# Patient Record
Sex: MALE | Race: WHITE | NOT HISPANIC OR LATINO | Employment: OTHER | URBAN - METROPOLITAN AREA
[De-identification: names, ages, dates, MRNs, and addresses within clinical notes are randomized per-mention and may not be internally consistent; named-entity substitution may affect disease eponyms.]

---

## 2019-01-22 ENCOUNTER — OFFICE VISIT (OUTPATIENT)
Dept: PODIATRY | Facility: CLINIC | Age: 34
End: 2019-01-22
Payer: MEDICARE

## 2019-01-22 VITALS
HEART RATE: 65 BPM | TEMPERATURE: 98 F | DIASTOLIC BLOOD PRESSURE: 89 MMHG | BODY MASS INDEX: 27.83 KG/M2 | HEIGHT: 73 IN | WEIGHT: 210 LBS | SYSTOLIC BLOOD PRESSURE: 135 MMHG

## 2019-01-22 DIAGNOSIS — M62.81 MUSCLE WEAKNESS OF LOWER EXTREMITY: ICD-10-CM

## 2019-01-22 DIAGNOSIS — G60.9 IDIOPATHIC PERIPHERAL NEUROPATHY: ICD-10-CM

## 2019-01-22 DIAGNOSIS — L84 CORN OR CALLUS: Primary | ICD-10-CM

## 2019-01-22 PROCEDURE — 11056 PARNG/CUTG B9 HYPRKR LES 2-4: CPT | Mod: Q8,,, | Performed by: PODIATRIST

## 2019-01-22 PROCEDURE — 99499 NO LOS: ICD-10-PCS | Mod: ,,, | Performed by: PODIATRIST

## 2019-01-22 PROCEDURE — 11720 PR DEBRIDEMENT OF NAIL(S), 1-5: ICD-10-PCS | Mod: 59,Q8,, | Performed by: PODIATRIST

## 2019-01-22 PROCEDURE — 11720 DEBRIDE NAIL 1-5: CPT | Mod: 59,Q8,, | Performed by: PODIATRIST

## 2019-01-22 PROCEDURE — 99499 UNLISTED E&M SERVICE: CPT | Mod: ,,, | Performed by: PODIATRIST

## 2019-01-22 PROCEDURE — 11056 PR TRIM BENIGN HYPERKERATOTIC SKIN LESION,2-4: ICD-10-PCS | Mod: Q8,,, | Performed by: PODIATRIST

## 2019-01-22 RX ORDER — POLYETHYLENE GLYCOL 3350 17 G/17G
POWDER, FOR SOLUTION ORAL
Refills: 5 | COMMUNITY
Start: 2018-11-06 | End: 2022-02-16 | Stop reason: SDUPTHER

## 2019-01-22 RX ORDER — CLONAZEPAM 1 MG/1
TABLET ORAL
Refills: 0 | COMMUNITY
Start: 2019-01-13 | End: 2021-05-25 | Stop reason: SDUPTHER

## 2019-01-22 NOTE — PROGRESS NOTES
[address] [facility] [address]  [place] [address]  Phone: [phone]   [phone]    Patient's PCP:Earnestine Frederick MD last seen 1/20/19  Referring Provider: No ref. provider found    Subjective:      Chief Complaint:: Callouses (Bilateral Heels and first toes)    HPI  Ike Guardado is a 33 y.o. male who presents with a complaint of callouses bilateral heels and first toes.Current symptoms include pain,cracking.Symptoms have stayed the same.Treatment to date have included shaving,skin softening creams. Patients rates pain 3/10 on pain scale.    Vitals:    01/22/19 0911   BP: 135/89   Pulse: 65   Temp: 97.8 °F (36.6 °C)     Shoe Size: 11    History reviewed. No pertinent surgical history.  Past Medical History:   Diagnosis Date    Chronic constipation      History reviewed. No pertinent family history.     Social History:   Marital Status: Single  Alcohol History:  has no alcohol history on file.  Tobacco History:  reports that he has quit smoking. he has never used smokeless tobacco.  Drug History:  has no drug history on file.    Review of patient's allergies indicates:  No Known Allergies    Current Outpatient Medications   Medication Sig Dispense Refill    clonazePAM (KLONOPIN) 1 MG tablet   0    methadone (DOLOPHINE) 10 MG tablet Take 40 mg by mouth 3 (three) times daily. Takes 4 pills in morning, two at lunch and two at night      polyethylene glycol (GLYCOLAX) 17 gram/dose powder MIX 1 CAPFUL IN 8 OUNCES OF WATER DAILY  5    DILTIAZEM HCL (DILTIAZEM 2% CREAM) Apply topically 3 (three) times daily. Apply topically to anal area. 30 g 2    temazepam (RESTORIL) 22.5 MG capsule Take 22.5 mg by mouth as needed.       No current facility-administered medications for this visit.        Review of Systems   Constitutional: Negative for chills, fatigue, fever and unexpected weight change.   HENT: Negative for hearing loss and trouble swallowing.    Eyes: Negative for photophobia and visual  "disturbance.   Respiratory: Negative for cough, shortness of breath and wheezing.    Cardiovascular: Negative for chest pain, palpitations and leg swelling.   Gastrointestinal: Negative for abdominal pain and nausea.   Genitourinary: Negative for dysuria and frequency.   Musculoskeletal: Negative for arthralgias, back pain and joint swelling.   Skin: Negative for rash.   Neurological: Negative for tremors, seizures, weakness, numbness and headaches.   Hematological: Does not bruise/bleed easily.         Objective:        Physical Exam:   General    Vitals reviewed.  Constitutional: He appears well-developed and well-nourished.             Physical Exam   Constitutional: He appears well-developed and well-nourished.   Musculoskeletal:        Feet:    Vitals reviewed.      Imaging:          Assessment:       1. Corn or callus - Left Foot    2. Muscle weakness of lower extremity    3. Idiopathic peripheral neuropathy      Plan:   Corn or callus - Left Foot  -     Routine Foot Care    Muscle weakness of lower extremity  -     Routine Foot Care    Idiopathic peripheral neuropathy  -     Routine Foot Care      Follow-up in about 3 months (around 4/22/2019).    Routine Foot Care  Date/Time: 1/22/2019 1:31 PM  Performed by: Deangelo Garcia DPM  Authorized by: Deangelo Garcia DPM     Time out: Immediately prior to procedure a "time out" was called to verify the correct patient, procedure, equipment, support staff and site/side marked as required.    Consent Done?:  Not Needed  Hyperkeratotic Skin Lesions?: Yes    Number of trimmed lesions:  3  Location(s):  Left 1st Toe, Right Heel and Left Heel    Nail Care Type:  Debride(Left 1st Toe and Right 1st Toe)  Patient tolerance:  Patient tolerated the procedure well with no immediate complications       - None    Counseling:     I provided patient education verbally regarding:   Patient diagnosis, treatment options, as well as alternatives, risks, and benefits.     This " note was created using Dragon voice recognition software that occasionally misinterpreted phrases or words.

## 2019-04-23 ENCOUNTER — OFFICE VISIT (OUTPATIENT)
Dept: PODIATRY | Facility: CLINIC | Age: 34
End: 2019-04-23
Payer: MEDICARE

## 2019-04-23 VITALS
BODY MASS INDEX: 27.83 KG/M2 | RESPIRATION RATE: 17 BRPM | OXYGEN SATURATION: 99 % | HEART RATE: 61 BPM | WEIGHT: 210 LBS | HEIGHT: 73 IN

## 2019-04-23 DIAGNOSIS — L98.9 BENIGN SKIN LESION: Primary | ICD-10-CM

## 2019-04-23 DIAGNOSIS — G60.9 IDIOPATHIC PERIPHERAL NEUROPATHY: ICD-10-CM

## 2019-04-23 DIAGNOSIS — M62.81 MUSCLE WEAKNESS OF LOWER EXTREMITY: ICD-10-CM

## 2019-04-23 PROCEDURE — 11056 PARNG/CUTG B9 HYPRKR LES 2-4: CPT | Mod: Q8,,, | Performed by: PODIATRIST

## 2019-04-23 PROCEDURE — 99499 UNLISTED E&M SERVICE: CPT | Mod: ,,, | Performed by: PODIATRIST

## 2019-04-23 PROCEDURE — 99499 NO LOS: ICD-10-PCS | Mod: ,,, | Performed by: PODIATRIST

## 2019-04-23 PROCEDURE — 11056 PR TRIM BENIGN HYPERKERATOTIC SKIN LESION,2-4: ICD-10-PCS | Mod: Q8,,, | Performed by: PODIATRIST

## 2019-04-23 NOTE — PROGRESS NOTES
[address] [facility] [address]  [place] [address]  Phone: [phone]   [phone]    Patient's PCP:Earnestine Frederick MD  Date Last Seen 01/20/2019  Referring Provider: No ref. provider found    Subjective:      Chief Complaint:: Routine Foot Care    HPI  Ike Guardado is a 34 y.o. male who presents for routine foot care of bilateral callouses. Treatment to date have included periodic debridement. Patients rates pain 1/10 on pain scale.        Vitals:    04/23/19 0842   Pulse: 61   Resp: 17     Shoe Size: 11    History reviewed. No pertinent surgical history.  Past Medical History:   Diagnosis Date    Chronic constipation      History reviewed. No pertinent family history.     Social History:   Marital Status: Single  Alcohol History:  has no alcohol history on file.  Tobacco History:  reports that he has quit smoking. He has never used smokeless tobacco.  Drug History:  has no drug history on file.    Review of patient's allergies indicates:  No Known Allergies    Current Outpatient Medications   Medication Sig Dispense Refill    clonazePAM (KLONOPIN) 1 MG tablet   0    DILTIAZEM HCL (DILTIAZEM 2% CREAM) Apply topically 3 (three) times daily. Apply topically to anal area. 30 g 2    methadone (DOLOPHINE) 10 MG tablet Take 40 mg by mouth 3 (three) times daily. Takes 4 pills in morning, two at lunch and two at night      polyethylene glycol (GLYCOLAX) 17 gram/dose powder MIX 1 CAPFUL IN 8 OUNCES OF WATER DAILY  5    temazepam (RESTORIL) 22.5 MG capsule Take 22.5 mg by mouth as needed.       No current facility-administered medications for this visit.        Review of Systems   Constitutional: Negative for chills, fatigue, fever and unexpected weight change.   HENT: Negative for hearing loss and trouble swallowing.    Eyes: Negative for photophobia and visual disturbance.   Respiratory: Negative for cough, shortness of breath and wheezing.    Cardiovascular: Negative for chest pain, palpitations  and leg swelling.   Gastrointestinal: Negative for abdominal pain and nausea.   Genitourinary: Negative for dysuria and frequency.   Musculoskeletal: Negative for arthralgias, back pain and joint swelling.   Skin: Negative for rash.   Neurological: Negative for tremors, seizures, weakness, numbness and headaches.   Hematological: Does not bruise/bleed easily.         Objective:        Physical Exam:   Foot Exam    General  General Appearance: appears stated age and healthy   Orientation: alert and oriented to person, place, and time   Affect: appropriate   Gait: antalgic       Right Foot/Ankle     Neurovascular  Dorsalis pedis: 2+  Posterior tibial: 2+  Saphenous nerve sensation: normal  Tibial nerve sensation: normal  Superficial peroneal nerve sensation: normal  Deep peroneal nerve sensation: normal  Sural nerve sensation: normal    Range of Motion    Passive  Ankle dorsiflexion: 0  Ankle plantar flexion: 10    Active  Ankle dorsiflexion: 0  Ankle plantar flexion: 10      Left Foot/Ankle      Neurovascular  Dorsalis pedis: 2+  Posterior tibial: 2+  Saphenous nerve sensation: normal  Tibial nerve sensation: normal  Superficial peroneal nerve sensation: normal  Deep peroneal nerve sensation: normal  Sural nerve sensation: normal    Range of Motion    Passive  Ankle dorsiflexion: 0  Plantar flexion: 10    Active  Ankle dorsiflexion: 0  Plantar flexion: 10          Physical Exam   Cardiovascular:   Pulses:       Dorsalis pedis pulses are 2+ on the right side, and 2+ on the left side.        Posterior tibial pulses are 2+ on the right side, and 2+ on the left side.   Musculoskeletal:        Feet:        Imaging:            Assessment:       1. Benign skin lesion    2. Idiopathic peripheral neuropathy    3. Muscle weakness of lower extremity      Plan:   Benign skin lesion    Idiopathic peripheral neuropathy    Muscle weakness of lower extremity    The patient will continue using skin softeners to the feet to help  reduce the thickness of the keratosis. The keratosis on both heels and the plantar surface of the forefoot were debrided to normal skin without any hemorrhage. The patient tolerated procedures well without any complications. The patient will return as needed.  No follow-ups on file.    Procedures - None    Counseling:     I provided patient education verbally regarding:   Patient diagnosis, treatment options, as well as alternatives, risks, and benefits.     This note was created using Dragon voice recognition software that occasionally misinterpreted phrases or words.

## 2019-07-23 ENCOUNTER — OFFICE VISIT (OUTPATIENT)
Dept: PODIATRY | Facility: CLINIC | Age: 34
End: 2019-07-23
Payer: MEDICARE

## 2019-07-23 VITALS
HEART RATE: 60 BPM | WEIGHT: 220 LBS | DIASTOLIC BLOOD PRESSURE: 83 MMHG | SYSTOLIC BLOOD PRESSURE: 124 MMHG | BODY MASS INDEX: 29.16 KG/M2 | HEIGHT: 73 IN

## 2019-07-23 DIAGNOSIS — G60.9 IDIOPATHIC PERIPHERAL NEUROPATHY: Primary | ICD-10-CM

## 2019-07-23 DIAGNOSIS — M62.81 MUSCLE WEAKNESS OF LOWER EXTREMITY: ICD-10-CM

## 2019-07-23 DIAGNOSIS — L84 CORN OR CALLUS: ICD-10-CM

## 2019-07-23 PROCEDURE — 11056 ROUTINE FOOT CARE: ICD-10-PCS | Mod: Q8,ICN,, | Performed by: PODIATRIST

## 2019-07-23 PROCEDURE — 11056 PARNG/CUTG B9 HYPRKR LES 2-4: CPT | Mod: Q8,ICN,, | Performed by: PODIATRIST

## 2019-07-23 NOTE — PROGRESS NOTES
[address] [facility] [address]  [place] [address]  Phone: [phone]   [phone]    Patient's PCP:Earnestine Frederick MD-Date Last Seen 05/08/2019  Referring Provider: No ref. provider found    Subjective:      Chief Complaint:: Callouses (Bilateral)    HPI  Ike Guardado is a 34 y.o. male who presents today for debridement of callouses. Patients rates pain 0/10 on pain scale.        Vitals:    07/23/19 0849   BP: 124/83   Pulse: 60     Shoe Size:     History reviewed. No pertinent surgical history.  Past Medical History:   Diagnosis Date    Chronic constipation      History reviewed. No pertinent family history.     Social History:   Marital Status: Single  Alcohol History:  has no alcohol history on file.  Tobacco History:  reports that he has quit smoking. He has never used smokeless tobacco.  Drug History:  has no drug history on file.    Review of patient's allergies indicates:  No Known Allergies    Current Outpatient Medications   Medication Sig Dispense Refill    clonazePAM (KLONOPIN) 1 MG tablet   0    DILTIAZEM HCL (DILTIAZEM 2% CREAM) Apply topically 3 (three) times daily. Apply topically to anal area. 30 g 2    methadone (DOLOPHINE) 10 MG tablet Take 40 mg by mouth 3 (three) times daily. Takes 4 pills in morning, two at lunch and two at night      polyethylene glycol (GLYCOLAX) 17 gram/dose powder MIX 1 CAPFUL IN 8 OUNCES OF WATER DAILY  5    temazepam (RESTORIL) 22.5 MG capsule Take 22.5 mg by mouth as needed.       No current facility-administered medications for this visit.        Review of Systems      Objective:        Physical Exam:   Foot Exam    Right Foot/Ankle     Neurovascular  Dorsalis pedis: 2+  Posterior tibial: 2+  Saphenous nerve sensation: diminished  Tibial nerve sensation: diminished  Superficial peroneal nerve sensation: diminished  Deep peroneal nerve sensation: diminished  Sural nerve sensation: diminished    Muscle Strength  Ankle dorsiflexion: 4-  Ankle  "plantar flexion: 4-  Ankle inversion: 4-  Ankle eversion: 4-  Great toe extension: 4-  Great toe flexion: 4-    Range of Motion    Passive  Ankle dorsiflexion: 0    Active  Ankle dorsiflexion: 0      Left Foot/Ankle      Neurovascular  Dorsalis pedis: 2+  Posterior tibial: 2+  Saphenous nerve sensation: diminished  Tibial nerve sensation: diminished  Superficial peroneal nerve sensation: diminished  Deep peroneal nerve sensation: diminished  Sural nerve sensation: diminished    Muscle Strength  Ankle dorsiflexion: 4-  Ankle plantar flexion: 4-  Ankle inversion: 4-  Ankle eversion: 4-  Great toe extension: 4-  Great toe flexion: 4-    Range of Motion    Passive  Ankle dorsiflexion: 0    Active  Ankle dorsiflexion: 0          Physical Exam   Cardiovascular:   Pulses:       Dorsalis pedis pulses are 2+ on the right side, and 2+ on the left side.        Posterior tibial pulses are 2+ on the right side, and 2+ on the left side.   Musculoskeletal:        Legs:       Feet:        Imaging:            Assessment:       1. Idiopathic peripheral neuropathy    2. Muscle weakness of lower extremity    3. Corn or callus - Left Foot      Plan:   Idiopathic peripheral neuropathy    Muscle weakness of lower extremity    Corn or callus - Left Foot  -     Routine Foot Care      No follow-ups on file.    Routine Foot Care  Date/Time: 7/23/2019 9:01 AM  Performed by: Deangelo Garcia DPM  Authorized by: Deangelo Garcia DPM     Time out: Immediately prior to procedure a "time out" was called to verify the correct patient, procedure, equipment, support staff and site/side marked as required.    Consent Done?:  Not Needed  Hyperkeratotic Skin Lesions?: Yes    Number of trimmed lesions:  2  Location(s):  Left 1st Toe and Right Heel    Nail Care Type:  Trim (No nails debrided)  Patient tolerance:  Patient tolerated the procedure well with no immediate complications            - None    Counseling:     I provided patient education " verbally regarding:   Patient diagnosis, treatment options, as well as alternatives, risks, and benefits.     This note was created using Dragon voice recognition software that occasionally misinterpreted phrases or words.

## 2019-10-29 ENCOUNTER — OFFICE VISIT (OUTPATIENT)
Dept: PODIATRY | Facility: CLINIC | Age: 34
End: 2019-10-29
Payer: MEDICARE

## 2019-10-29 VITALS
HEART RATE: 56 BPM | HEIGHT: 73 IN | WEIGHT: 220 LBS | SYSTOLIC BLOOD PRESSURE: 121 MMHG | BODY MASS INDEX: 29.16 KG/M2 | DIASTOLIC BLOOD PRESSURE: 79 MMHG

## 2019-10-29 DIAGNOSIS — M62.81 MUSCLE WEAKNESS OF LOWER EXTREMITY: ICD-10-CM

## 2019-10-29 DIAGNOSIS — G60.9 IDIOPATHIC PERIPHERAL NEUROPATHY: Primary | ICD-10-CM

## 2019-10-29 DIAGNOSIS — L84 CORN OR CALLUS: ICD-10-CM

## 2019-10-29 PROCEDURE — 99213 OFFICE O/P EST LOW 20 MIN: CPT | Mod: 25 | Performed by: PODIATRIST

## 2019-10-29 PROCEDURE — 11056 PARNG/CUTG B9 HYPRKR LES 2-4: CPT | Mod: S$PBB,Q8,, | Performed by: PODIATRIST

## 2019-10-29 PROCEDURE — 11056 PARNG/CUTG B9 HYPRKR LES 2-4: CPT | Mod: PBBFAC | Performed by: PODIATRIST

## 2019-10-29 PROCEDURE — 11056 PR TRIM BENIGN HYPERKERATOTIC SKIN LESION,2-4: ICD-10-PCS | Mod: S$PBB,Q8,, | Performed by: PODIATRIST

## 2019-10-29 NOTE — PROGRESS NOTES
[address] [facility] [address]  [place] [address]  Phone: [phone]   [phone]    Patient's PCP:Earnestine Frederick MD  Referring Provider: No ref. provider found    Subjective:       PCP: Dr. Earnestine Frederick  Date Last Seen:  08/08/2019    Chief Complaint:: Callouses (left foot)    KENNA Guardado is a 34 y.o. male who presents for debridement of callouses. Patients rates pain 0/10 on pain scale.      Vitals:    10/29/19 0846   BP: 121/79   Pulse: (!) 56     Shoe Size: 11    History reviewed. No pertinent surgical history.  Past Medical History:   Diagnosis Date    Chronic constipation      History reviewed. No pertinent family history.     Social History:   Marital Status: Single  Alcohol History:  has no alcohol history on file.  Tobacco History:  reports that he has quit smoking. He has never used smokeless tobacco.  Drug History:  has no drug history on file.    Review of patient's allergies indicates:  No Known Allergies    Current Outpatient Medications   Medication Sig Dispense Refill    clonazePAM (KLONOPIN) 1 MG tablet   0    DILTIAZEM HCL (DILTIAZEM 2% CREAM) Apply topically 3 (three) times daily. Apply topically to anal area. 30 g 2    methadone (DOLOPHINE) 10 MG tablet Take 40 mg by mouth 3 (three) times daily. Takes 4 pills in morning, two at lunch and two at night      polyethylene glycol (GLYCOLAX) 17 gram/dose powder MIX 1 CAPFUL IN 8 OUNCES OF WATER DAILY  5    temazepam (RESTORIL) 22.5 MG capsule Take 22.5 mg by mouth as needed.       No current facility-administered medications for this visit.        Review of Systems      Objective:        Physical Exam:   Foot Exam    General  General Appearance: appears stated age and healthy   Orientation: alert and oriented to person, place, and time   Affect: appropriate   Gait: antalgic   Assistance: cane use       Right Foot/Ankle     Inspection and Palpation  Ecchymosis: none  Tenderness: none   Swelling: none   Arch:  normal  Hammertoes: absent  Claw Toes: absent  Hallux valgus: no  Hallux limitus: no  Skin Exam: callus (Thick keratosis plantar right heel and plantar 1st toe with no ulceration or signs of infection);     Neurovascular  Dorsalis pedis: 2+  Posterior tibial: 2+  Saphenous nerve sensation: diminished  Tibial nerve sensation: diminished  Superficial peroneal nerve sensation: diminished  Deep peroneal nerve sensation: diminished  Sural nerve sensation: diminished    Muscle Strength  Ankle dorsiflexion: 4-  Ankle plantar flexion: 4-  Ankle inversion: 4-  Ankle eversion: 4-  Great toe extension: 4-  Great toe flexion: 4-    Range of Motion    Passive  Ankle dorsiflexion: 0  Ankle plantar flexion: 10    Active  Ankle dorsiflexion: 0  Ankle plantar flexion: 10      Left Foot/Ankle      Inspection and Palpation  Ecchymosis: none  Tenderness: none   Swelling: none   Arch: normal  Hammertoes: absent  Claw toes: absent  Hallux valgus: no  Hallux limitus: no  Skin Exam: callus (Thick keratosis plantar 1st toe no ulceration or signs of infection);     Neurovascular  Dorsalis pedis: 2+  Posterior tibial: 2+  Saphenous nerve sensation: diminished  Tibial nerve sensation: diminished  Superficial peroneal nerve sensation: diminished  Deep peroneal nerve sensation: diminished  Sural nerve sensation: diminished    Muscle Strength  Ankle dorsiflexion: 4-  Ankle plantar flexion: 4-  Ankle inversion: 4-  Ankle eversion: 4-  Great toe extension: 4-  Great toe flexion: 4-    Range of Motion    Passive  Ankle dorsiflexion: 0  Plantar flexion: 10    Active  Ankle dorsiflexion: 0  Plantar flexion: 10          Physical Exam   Cardiovascular:   Pulses:       Dorsalis pedis pulses are 2+ on the right side, and 2+ on the left side.        Posterior tibial pulses are 2+ on the right side, and 2+ on the left side.   Feet:   Right Foot:   Skin Integrity: Positive for callus (Thick keratosis plantar right heel and plantar 1st toe with no ulceration  or signs of infection).   Left Foot:   Skin Integrity: Positive for callus (Thick keratosis plantar 1st toe no ulceration or signs of infection).       Imaging:            Assessment:       1. Idiopathic peripheral neuropathy    2. Muscle weakness of lower extremity    3. Corn or callus - Left Foot      Plan:   Idiopathic peripheral neuropathy    Muscle weakness of lower extremity    Corn or callus - Left Foot    With patient's verbal consent the hyperkeratotic lesions on both feet were debrided to healthy appearing skin using a sterile #15 scalpel. No anesthesia required. No blood loss. She tolerated the procedure well without complications.  Return as needed  Follow up in about 3 months (around 1/29/2020) for c&c q8.    Procedures - None    Counseling:     I provided patient education verbally regarding:   Patient diagnosis, treatment options, as well as alternatives, risks, and benefits.     This note was created using Dragon voice recognition software that occasionally misinterpreted phrases or words.

## 2020-02-04 ENCOUNTER — OFFICE VISIT (OUTPATIENT)
Dept: PODIATRY | Facility: CLINIC | Age: 35
End: 2020-02-04
Payer: MEDICARE

## 2020-02-04 VITALS
DIASTOLIC BLOOD PRESSURE: 81 MMHG | WEIGHT: 220 LBS | HEART RATE: 80 BPM | BODY MASS INDEX: 29.16 KG/M2 | HEIGHT: 73 IN | SYSTOLIC BLOOD PRESSURE: 115 MMHG

## 2020-02-04 DIAGNOSIS — L84 CORN OR CALLUS: ICD-10-CM

## 2020-02-04 DIAGNOSIS — M62.81 MUSCLE WEAKNESS OF LOWER EXTREMITY: ICD-10-CM

## 2020-02-04 DIAGNOSIS — G60.9 IDIOPATHIC PERIPHERAL NEUROPATHY: Primary | ICD-10-CM

## 2020-02-04 PROCEDURE — 99213 OFFICE O/P EST LOW 20 MIN: CPT | Performed by: PODIATRIST

## 2020-02-04 PROCEDURE — 11056 PARNG/CUTG B9 HYPRKR LES 2-4: CPT | Mod: Q8,S$PBB,, | Performed by: PODIATRIST

## 2020-02-04 PROCEDURE — 11056 PR TRIM BENIGN HYPERKERATOTIC SKIN LESION,2-4: ICD-10-PCS | Mod: Q8,S$PBB,, | Performed by: PODIATRIST

## 2020-02-04 PROCEDURE — 11056 PARNG/CUTG B9 HYPRKR LES 2-4: CPT | Mod: Q8,PBBFAC | Performed by: PODIATRIST

## 2020-02-04 NOTE — PROGRESS NOTES
[address] [facility] [address]  [place] [address]  Phone: [phone]   [phone]    Patient's PCP:Earnestine Frederick MD   Referring Provider: No ref. provider found    Subjective:       PCP:  Earnestine Frederick  Date Last Seen:  11/05/19    Chief Complaint:: Callouses (bilateral callouses)    KENNA Guardado is a 34 y.o. male who returns for complaint of bilateral calluses for debridement of callouses. Patients rates pain 0/10 on pain scale.    Vitals:    02/04/20 0840   BP: 115/81   Pulse: 80     Shoe Size:     History reviewed. No pertinent surgical history.  Past Medical History:   Diagnosis Date    Chronic constipation      History reviewed. No pertinent family history.     Social History:   Marital Status: Single  Alcohol History:  has no alcohol history on file.  Tobacco History:  reports that he has quit smoking. He has never used smokeless tobacco.  Drug History:  has no drug history on file.    Review of patient's allergies indicates:  No Known Allergies    Current Outpatient Medications   Medication Sig Dispense Refill    clonazePAM (KLONOPIN) 1 MG tablet   0    DILTIAZEM HCL (DILTIAZEM 2% CREAM) Apply topically 3 (three) times daily. Apply topically to anal area. 30 g 2    methadone (DOLOPHINE) 10 MG tablet Take 40 mg by mouth 3 (three) times daily. Takes 4 pills in morning, two at lunch and two at night      polyethylene glycol (GLYCOLAX) 17 gram/dose powder MIX 1 CAPFUL IN 8 OUNCES OF WATER DAILY  5    temazepam (RESTORIL) 22.5 MG capsule Take 22.5 mg by mouth as needed.       No current facility-administered medications for this visit.        Review of Systems      Objective:        Physical Exam:   Foot Exam    General  General Appearance: appears stated age and healthy   Orientation: alert and oriented to person, place, and time   Affect: appropriate   Gait: antalgic       Right Foot/Ankle     Inspection and Palpation  Tenderness: great toe interphalangeal joint   Hallux  limitus: yes  Skin Exam: callus (plantar 1 st toe without ulcerartion.  Heel calluses with fissures.);     Neurovascular  Dorsalis pedis: absent  Posterior tibial: 1+  Saphenous nerve sensation: diminished  Tibial nerve sensation: diminished  Superficial peroneal nerve sensation: diminished  Deep peroneal nerve sensation: diminished  Sural nerve sensation: diminished    Muscle Strength  Ankle dorsiflexion: 4-  Ankle plantar flexion: 4-  Ankle inversion: 4-  Ankle eversion: 4-  Great toe extension: 4-  Great toe flexion: 4-    Range of Motion    Passive  Ankle dorsiflexion: 0  1st MTP extension: 5        Left Foot/Ankle      Inspection and Palpation  Tenderness: great toe interphalangeal joint and calcaneus tenderness   Hallux limitus: yes  Skin Exam: callus (callus with dry subdermal hemmoraghe without infection.); (fungal incurvated 1st toenail without infection)    Neurovascular  Dorsalis pedis: absent  Posterior tibial: 1+  Saphenous nerve sensation: diminished  Tibial nerve sensation: diminished  Superficial peroneal nerve sensation: diminished  Deep peroneal nerve sensation: diminished  Sural nerve sensation: diminished    Muscle Strength  Ankle dorsiflexion: 4-  Ankle plantar flexion: 4-  Ankle inversion: 4-  Ankle eversion: 4-  Great toe extension: 4-  Great toe flexion: 4-    Range of Motion    Passive  Ankle dorsiflexion: 0  1st MTP extension: 5            Physical Exam   Cardiovascular:   Pulses:       Dorsalis pedis pulses are Absent on the right side, and Absent on the left side.        Posterior tibial pulses are 1+ on the right side, and 1+ on the left side.   Feet:   Right Foot:   Skin Integrity: Positive for callus (plantar 1 st toe without ulcerartion.  Heel calluses with fissures.).   Left Foot:   Skin Integrity: Positive for callus (callus with dry subdermal hemmoraghe without infection.).       This patient has documented high risk feet requiring routine maintenance and has the following  qualifying factors:  Absent dorsalis pedis pulse bilateral and Hair growth is absent, nail changes with thickening and Skin color is rubor      Imaging:            Assessment:       1. Idiopathic peripheral neuropathy    2. Muscle weakness of lower extremity    3. Corn or callus - Left Foot      Plan:   Idiopathic peripheral neuropathy    Muscle weakness of lower extremity    Corn or callus - Left Foot    With patient's verbal consent the hyperkeratotic lesions  Callus/grade 0 ulcer 1 st toe bilateral and left heel  were debrided to healthy appearing skin using a scalpel. Patient relates relief of pain following the procedure. Patient tolerated the procedure well without complications. No anesthesia or hemostasis required. No blood loss.  Follow up in about 3 months (around 5/4/2020).    Procedures - None    Counseling:     I provided patient education verbally regarding:   Patient diagnosis, treatment options, as well as alternatives, risks, and benefits.     This note was created using Dragon voice recognition software that occasionally misinterpreted phrases or words.

## 2020-05-07 ENCOUNTER — OFFICE VISIT (OUTPATIENT)
Dept: PODIATRY | Facility: CLINIC | Age: 35
End: 2020-05-07
Payer: MEDICARE

## 2020-05-07 VITALS — BODY MASS INDEX: 27.83 KG/M2 | HEIGHT: 73 IN | WEIGHT: 210 LBS | TEMPERATURE: 99 F

## 2020-05-07 DIAGNOSIS — M62.81 MUSCLE WEAKNESS OF LOWER EXTREMITY: Primary | ICD-10-CM

## 2020-05-07 DIAGNOSIS — L98.9 BENIGN SKIN LESION: ICD-10-CM

## 2020-05-07 DIAGNOSIS — G60.9 IDIOPATHIC PERIPHERAL NEUROPATHY: ICD-10-CM

## 2020-05-07 DIAGNOSIS — L84 CORN OR CALLUS: ICD-10-CM

## 2020-05-07 PROCEDURE — 99213 PR OFFICE/OUTPT VISIT, EST, LEVL III, 20-29 MIN: ICD-10-PCS | Mod: S$PBB,,, | Performed by: PODIATRIST

## 2020-05-07 PROCEDURE — 99213 OFFICE O/P EST LOW 20 MIN: CPT | Mod: S$PBB,,, | Performed by: PODIATRIST

## 2020-05-07 PROCEDURE — 99213 OFFICE O/P EST LOW 20 MIN: CPT | Performed by: PODIATRIST

## 2020-05-07 RX ORDER — ICOSAPENT ETHYL 1000 MG/1
CAPSULE ORAL
COMMUNITY
Start: 2020-05-05 | End: 2021-05-24

## 2020-05-07 NOTE — PROGRESS NOTES
[address] [facility] [address]  [place] [address]  Phone: [phone]   [phone]    Patient's PCP:Earnestine Frederick MD  Referring Provider: No ref. provider found    Subjective:      Chief Complaint:: Callouses    HPI  Ike Guardado is a 35 y.o. male who presents for debridement of bilateral calluses.  Patient rates pain 0/10 on a pain scale.      PCP: Dr. Earnestine Frederick  Date Last Seen: 05/01/2020 (telemedicine visit)     Vitals:    05/07/20 0850   Temp: 98.7 °F (37.1 °C)     Shoe Size:     History reviewed. No pertinent surgical history.  Past Medical History:   Diagnosis Date    Chronic constipation      History reviewed. No pertinent family history.     Social History:   Marital Status: Single  Alcohol History:  has no alcohol history on file.  Tobacco History:  reports that he has quit smoking. He has never used smokeless tobacco.  Drug History:  has no drug history on file.    Review of patient's allergies indicates:  No Known Allergies    Current Outpatient Medications   Medication Sig Dispense Refill    clonazePAM (KLONOPIN) 1 MG tablet   0    DILTIAZEM HCL (DILTIAZEM 2% CREAM) Apply topically 3 (three) times daily. Apply topically to anal area. 30 g 2    methadone (DOLOPHINE) 10 MG tablet Take 40 mg by mouth 3 (three) times daily. Takes 4 pills in morning, two at lunch and two at night      polyethylene glycol (GLYCOLAX) 17 gram/dose powder MIX 1 CAPFUL IN 8 OUNCES OF WATER DAILY  5    temazepam (RESTORIL) 22.5 MG capsule Take 22.5 mg by mouth as needed.      VASCEPA 1 gram Cap        No current facility-administered medications for this visit.        Review of Systems      Objective:        Physical Exam:   Foot Exam    General  General Appearance: appears stated age and healthy   Orientation: alert and oriented to person, place, and time   Affect: appropriate   Gait: antalgic   Assistance: cane use       Right Foot/Ankle     Inspection and Palpation  Tenderness: calcaneus  tenderness and great toe interphalangeal joint   Swelling: none   Arch: normal  Skin Exam: callus (Plantar 1st toe and plantar heel with no ulceration);     Neurovascular  Dorsalis pedis: 2+  Posterior tibial: 2+  Saphenous nerve sensation: diminished  Tibial nerve sensation: diminished  Superficial peroneal nerve sensation: diminished  Deep peroneal nerve sensation: diminished  Sural nerve sensation: diminished    Muscle Strength  Ankle dorsiflexion: 4  Ankle plantar flexion: 4  Ankle inversion: 4  Ankle eversion: 4    Range of Motion    Passive  Ankle dorsiflexion: 0  Ankle plantar flexion: 10    Active  Ankle dorsiflexion: 0  Ankle plantar flexion: 10      Left Foot/Ankle      Inspection and Palpation  Tenderness: great toe interphalangeal joint   Swelling: none   Arch: normal  Skin Exam: callus (Plantar 1st toe with no ulceration);     Neurovascular  Dorsalis pedis: 2+  Posterior tibial: 2+  Saphenous nerve sensation: diminished  Tibial nerve sensation: diminished  Superficial peroneal nerve sensation: diminished  Deep peroneal nerve sensation: diminished  Sural nerve sensation: diminished    Muscle Strength  Ankle dorsiflexion: 4  Ankle plantar flexion: 4  Ankle inversion: 4  Ankle eversion: 4    Range of Motion    Passive  Ankle dorsiflexion: 0  Plantar flexion: 10    Active  Ankle dorsiflexion: 0  Plantar flexion: 10          Physical Exam   Cardiovascular:   Pulses:       Dorsalis pedis pulses are 2+ on the right side, and 2+ on the left side.        Posterior tibial pulses are 2+ on the right side, and 2+ on the left side.   Musculoskeletal:        Legs:       Feet:    Feet:   Right Foot:   Skin Integrity: Positive for callus (Plantar 1st toe and plantar heel with no ulceration).   Left Foot:   Skin Integrity: Positive for callus (Plantar 1st toe with no ulceration).       Imaging:            Assessment:       1. Muscle weakness of lower extremity    2. Idiopathic peripheral neuropathy    3. Corn or callus  - Left Foot    4. Benign skin lesion      Plan:   Muscle weakness of lower extremity    Idiopathic peripheral neuropathy    Corn or callus - Left Foot    Benign skin lesion     Debridement of multiple keratosis return as needed.  No follow-ups on file.    Procedures - None    Counseling:     I provided patient education verbally regarding:   Patient diagnosis, treatment options, as well as alternatives, risks, and benefits.     This note was created using Dragon voice recognition software that occasionally misinterpreted phrases or words.

## 2020-08-13 ENCOUNTER — OFFICE VISIT (OUTPATIENT)
Dept: PODIATRY | Facility: CLINIC | Age: 35
End: 2020-08-13
Payer: MEDICARE

## 2020-08-13 VITALS
TEMPERATURE: 98 F | HEART RATE: 78 BPM | BODY MASS INDEX: 27.83 KG/M2 | DIASTOLIC BLOOD PRESSURE: 81 MMHG | HEIGHT: 73 IN | WEIGHT: 210 LBS | SYSTOLIC BLOOD PRESSURE: 115 MMHG

## 2020-08-13 DIAGNOSIS — L84 CORN OR CALLUS: ICD-10-CM

## 2020-08-13 DIAGNOSIS — L98.9 BENIGN SKIN LESION: ICD-10-CM

## 2020-08-13 DIAGNOSIS — M62.81 MUSCLE WEAKNESS OF LOWER EXTREMITY: Primary | ICD-10-CM

## 2020-08-13 DIAGNOSIS — G60.9 IDIOPATHIC PERIPHERAL NEUROPATHY: ICD-10-CM

## 2020-08-13 PROCEDURE — 99213 OFFICE O/P EST LOW 20 MIN: CPT | Mod: S$PBB,,, | Performed by: PODIATRIST

## 2020-08-13 PROCEDURE — 99213 PR OFFICE/OUTPT VISIT, EST, LEVL III, 20-29 MIN: ICD-10-PCS | Mod: S$PBB,,, | Performed by: PODIATRIST

## 2020-08-13 PROCEDURE — 99214 OFFICE O/P EST MOD 30 MIN: CPT | Performed by: PODIATRIST

## 2020-08-13 RX ORDER — FENOFIBRATE 160 MG/1
TABLET ORAL
COMMUNITY
Start: 2020-08-05 | End: 2021-05-24

## 2020-08-13 NOTE — PROGRESS NOTES
[address] [facility] [address]  [place] [address]  Phone: [phone]   [phone]    Patient's PCP:Earnestine Frederick MD  Referring Provider: No ref. provider found    Subjective:      Chief Complaint:: Routine Foot Care (callus-Q8)    HPI  Ike Guardado is a 35 y.o. male who presents with multiple callus/benign skin lesions . Pain scale 3 /10.       There were no vitals filed for this visit.  Shoe Size:     History reviewed. No pertinent surgical history.  Past Medical History:   Diagnosis Date    Chronic constipation      History reviewed. No pertinent family history.     Social History:   Marital Status: Single  Alcohol History:  has no history on file for alcohol.  Tobacco History:  reports that he has quit smoking. He has never used smokeless tobacco.  Drug History:  has no history on file for drug.    Review of patient's allergies indicates:  No Known Allergies    Current Outpatient Medications   Medication Sig Dispense Refill    clonazePAM (KLONOPIN) 1 MG tablet   0    DILTIAZEM HCL (DILTIAZEM 2% CREAM) Apply topically 3 (three) times daily. Apply topically to anal area. 30 g 2    fenofibrate 160 MG Tab       methadone (DOLOPHINE) 10 MG tablet Take 40 mg by mouth 3 (three) times daily. Takes 4 pills in morning, two at lunch and two at night      polyethylene glycol (GLYCOLAX) 17 gram/dose powder MIX 1 CAPFUL IN 8 OUNCES OF WATER DAILY  5    temazepam (RESTORIL) 22.5 MG capsule Take 22.5 mg by mouth as needed.      VASCEPA 1 gram Cap        No current facility-administered medications for this visit.        Review of Systems      Objective:        Physical Exam:   Foot Exam    General  General Appearance: appears stated age and healthy   Orientation: alert and oriented to person, place, and time   Affect: appropriate   Gait: unimpaired       Right Foot/Ankle     Inspection and Palpation  Skin Exam: callus (Plantar 1st toe, plantar heel with no ulceration or signs of infection);      Neurovascular  Dorsalis pedis: 2+  Posterior tibial: 2+  Saphenous nerve sensation: diminished  Tibial nerve sensation: diminished  Superficial peroneal nerve sensation: diminished  Deep peroneal nerve sensation: diminished  Sural nerve sensation: diminished      Left Foot/Ankle      Inspection and Palpation  Skin Exam: callus (Plantar 1st toe and 1st MPJ with no ulceration or signs of infection);     Neurovascular  Dorsalis pedis: 2+  Posterior tibial: 2+  Saphenous nerve sensation: diminished  Tibial nerve sensation: diminished  Superficial peroneal nerve sensation: diminished  Deep peroneal nerve sensation: diminished  Sural nerve sensation: diminished          Physical Exam   Cardiovascular:   Pulses:       Dorsalis pedis pulses are 2+ on the right side and 2+ on the left side.        Posterior tibial pulses are 2+ on the right side and 2+ on the left side.   Feet:   Right Foot:   Skin Integrity: Positive for callus (Plantar 1st toe, plantar heel with no ulceration or signs of infection).   Left Foot:   Skin Integrity: Positive for callus (Plantar 1st toe and 1st MPJ with no ulceration or signs of infection).       Imaging:            Assessment:       1. Muscle weakness of lower extremity    2. Idiopathic peripheral neuropathy    3. Corn or callus - Left Foot    4. Benign skin lesion      Plan:   Muscle weakness of lower extremity    Idiopathic peripheral neuropathy    Corn or callus - Left Foot    Benign skin lesion     1.  Today evaluated patient will continue skin softeners accommodation in the shoes to relieve the problems of pressure on by his foot.  He will use callus smooth the nurse to keep it under control.  He return as needed.  There is no signs of infection today so no further treatment is needed.  No follow-ups on file.    Procedures - None    Counseling:     I provided patient education verbally regarding:   Patient diagnosis, treatment options, as well as alternatives, risks, and benefits.      This note was created using Dragon voice recognition software that occasionally misinterpreted phrases or words.

## 2020-11-12 ENCOUNTER — OFFICE VISIT (OUTPATIENT)
Dept: PODIATRY | Facility: CLINIC | Age: 35
End: 2020-11-12
Payer: MEDICARE

## 2020-11-12 VITALS
HEIGHT: 73 IN | SYSTOLIC BLOOD PRESSURE: 115 MMHG | TEMPERATURE: 98 F | HEART RATE: 78 BPM | WEIGHT: 215 LBS | DIASTOLIC BLOOD PRESSURE: 80 MMHG | BODY MASS INDEX: 28.49 KG/M2 | RESPIRATION RATE: 16 BRPM

## 2020-11-12 DIAGNOSIS — L98.9 BENIGN SKIN LESION: ICD-10-CM

## 2020-11-12 DIAGNOSIS — M62.81 MUSCLE WEAKNESS OF LOWER EXTREMITY: Primary | ICD-10-CM

## 2020-11-12 DIAGNOSIS — G60.9 IDIOPATHIC PERIPHERAL NEUROPATHY: ICD-10-CM

## 2020-11-12 PROCEDURE — 99213 PR OFFICE/OUTPT VISIT, EST, LEVL III, 20-29 MIN: ICD-10-PCS | Mod: S$GLB,,, | Performed by: PODIATRIST

## 2020-11-12 PROCEDURE — 99213 OFFICE O/P EST LOW 20 MIN: CPT | Mod: S$GLB,,, | Performed by: PODIATRIST

## 2020-11-12 RX ORDER — METFORMIN HYDROCHLORIDE 500 MG/1
TABLET, EXTENDED RELEASE ORAL
COMMUNITY
Start: 2020-11-11 | End: 2021-05-24

## 2020-11-12 NOTE — PROGRESS NOTES
[address] [facility] [address]  [place] [address]  Phone: [phone]   [phone]    Patient's PCP:Earnestine Frederick MD  Referring Provider: No ref. provider found    Subjective:      Chief Complaint:: Routine Foot Care (callus q8)    HPI  Ike Guardado is a 35 y.o. male who presents for debridement of bilateral calluses.  Patient rates pain 0/10 on a pain scale.       PCP: Dr. Earnestine Frederick  Date Last Seen: 11/4/2020    Vitals:    11/12/20 0849   BP: 115/80   Pulse: 78   Resp: 16   Temp: 97.9 °F (36.6 °C)     Shoe Size:     History reviewed. No pertinent surgical history.  Past Medical History:   Diagnosis Date    Chronic constipation      History reviewed. No pertinent family history.     Social History:   Marital Status: Single  Alcohol History:  has no history on file for alcohol.  Tobacco History:  reports that he has quit smoking. He has never used smokeless tobacco.  Drug History:  has no history on file for drug.    Review of patient's allergies indicates:  No Known Allergies    Current Outpatient Medications   Medication Sig Dispense Refill    clonazePAM (KLONOPIN) 1 MG tablet   0    DILTIAZEM HCL (DILTIAZEM 2% CREAM) Apply topically 3 (three) times daily. Apply topically to anal area. (Patient not taking: Reported on 8/13/2020) 30 g 2    fenofibrate 160 MG Tab       metFORMIN (GLUCOPHAGE-XR) 500 MG ER 24hr tablet       methadone (DOLOPHINE) 10 MG tablet Take 40 mg by mouth 3 (three) times daily. Takes 4 pills in morning, two at lunch and two at night      polyethylene glycol (GLYCOLAX) 17 gram/dose powder MIX 1 CAPFUL IN 8 OUNCES OF WATER DAILY  5    temazepam (RESTORIL) 22.5 MG capsule Take 22.5 mg by mouth as needed.      VASCEPA 1 gram Cap        No current facility-administered medications for this visit.        Review of Systems      Objective:        Physical Exam:   Foot Exam    General  General Appearance: appears stated age and healthy   Orientation: alert and oriented  to person, place, and time   Affect: appropriate   Gait: antalgic       Right Foot/Ankle     Inspection and Palpation  Skin Exam: callus (Plantar 1st toe and heel with no ulceration or signs of infection);     Neurovascular  Dorsalis pedis: 1+  Posterior tibial: 2+  Saphenous nerve sensation: diminished  Tibial nerve sensation: diminished  Superficial peroneal nerve sensation: diminished  Deep peroneal nerve sensation: diminished  Sural nerve sensation: diminished      Left Foot/Ankle      Inspection and Palpation  Skin Exam: callus (Plantar medial surface 1st toe with no ulceration or signs of infection);     Neurovascular  Dorsalis pedis: 1+  Posterior tibial: 2+  Saphenous nerve sensation: diminished  Tibial nerve sensation: diminished  Superficial peroneal nerve sensation: diminished  Deep peroneal nerve sensation: diminished  Sural nerve sensation: diminished          Physical Exam   Cardiovascular:   Pulses:       Dorsalis pedis pulses are 1+ on the right side and 1+ on the left side.        Posterior tibial pulses are 2+ on the right side and 2+ on the left side.   Feet:   Right Foot:   Skin Integrity: Positive for callus (Plantar 1st toe and heel with no ulceration or signs of infection).   Left Foot:   Skin Integrity: Positive for callus (Plantar medial surface 1st toe with no ulceration or signs of infection).       Imaging:            Assessment:       1. Muscle weakness of lower extremity    2. Benign skin lesion    3. Idiopathic peripheral neuropathy      Plan:   Muscle weakness of lower extremity    Benign skin lesion    Idiopathic peripheral neuropathy     1.  I evaluated patient today and findings are the same as previous visit with muscle weakness contractures of lower extremity and decreased sensation in multiple calluses with no ulceration.  Patient will continue keeping the area moist using callus softeners and filing down is needed he will return as needed.  No follow-ups on  file.    Procedures  No notes on file       Counseling:     I provided patient education verbally regarding:   Patient diagnosis, treatment options, as well as alternatives, risks, and benefits.     This note was created using Dragon voice recognition software that occasionally misinterpreted phrases or words.

## 2021-02-18 ENCOUNTER — OFFICE VISIT (OUTPATIENT)
Dept: PODIATRY | Facility: CLINIC | Age: 36
End: 2021-02-18
Payer: MEDICARE

## 2021-02-18 VITALS
BODY MASS INDEX: 28.49 KG/M2 | WEIGHT: 215 LBS | OXYGEN SATURATION: 98 % | RESPIRATION RATE: 18 BRPM | HEART RATE: 74 BPM | HEIGHT: 73 IN

## 2021-02-18 DIAGNOSIS — L98.9 BENIGN SKIN LESION: ICD-10-CM

## 2021-02-18 DIAGNOSIS — M62.81 MUSCLE WEAKNESS OF LOWER EXTREMITY: Primary | ICD-10-CM

## 2021-02-18 DIAGNOSIS — L84 CORN OR CALLUS: ICD-10-CM

## 2021-02-18 DIAGNOSIS — G60.9 IDIOPATHIC PERIPHERAL NEUROPATHY: ICD-10-CM

## 2021-02-18 PROCEDURE — 11056 PARNG/CUTG B9 HYPRKR LES 2-4: CPT | Mod: Q9,S$GLB,, | Performed by: PODIATRIST

## 2021-02-18 PROCEDURE — 99499 NO LOS: ICD-10-PCS | Mod: S$GLB,,, | Performed by: PODIATRIST

## 2021-02-18 PROCEDURE — 99499 UNLISTED E&M SERVICE: CPT | Mod: S$GLB,,, | Performed by: PODIATRIST

## 2021-02-18 PROCEDURE — 11056 PR TRIM BENIGN HYPERKERATOTIC SKIN LESION,2-4: ICD-10-PCS | Mod: Q9,S$GLB,, | Performed by: PODIATRIST

## 2021-02-18 RX ORDER — ERGOCALCIFEROL 1.25 MG/1
CAPSULE ORAL
COMMUNITY
Start: 2021-02-09 | End: 2021-10-15 | Stop reason: SDUPTHER

## 2021-05-18 ENCOUNTER — OFFICE VISIT (OUTPATIENT)
Dept: PODIATRY | Facility: CLINIC | Age: 36
End: 2021-05-18
Payer: MEDICARE

## 2021-05-18 VITALS
DIASTOLIC BLOOD PRESSURE: 87 MMHG | HEART RATE: 109 BPM | RESPIRATION RATE: 18 BRPM | BODY MASS INDEX: 28.49 KG/M2 | OXYGEN SATURATION: 98 % | SYSTOLIC BLOOD PRESSURE: 134 MMHG | HEIGHT: 73 IN | WEIGHT: 215 LBS

## 2021-05-18 DIAGNOSIS — G60.9 IDIOPATHIC PERIPHERAL NEUROPATHY: ICD-10-CM

## 2021-05-18 DIAGNOSIS — M62.81 MUSCLE WEAKNESS OF LOWER EXTREMITY: Primary | ICD-10-CM

## 2021-05-18 DIAGNOSIS — L98.9 BENIGN SKIN LESION: ICD-10-CM

## 2021-05-18 PROCEDURE — 11056 PARNG/CUTG B9 HYPRKR LES 2-4: CPT | Mod: Q8,S$GLB,, | Performed by: PODIATRIST

## 2021-05-18 PROCEDURE — 99499 NO LOS: ICD-10-PCS | Mod: S$GLB,,, | Performed by: PODIATRIST

## 2021-05-18 PROCEDURE — 99499 UNLISTED E&M SERVICE: CPT | Mod: S$GLB,,, | Performed by: PODIATRIST

## 2021-05-18 PROCEDURE — 11056 PR TRIM BENIGN HYPERKERATOTIC SKIN LESION,2-4: ICD-10-PCS | Mod: Q8,S$GLB,, | Performed by: PODIATRIST

## 2021-05-18 RX ORDER — HYDROXYZINE HYDROCHLORIDE 25 MG/1
TABLET, FILM COATED ORAL
COMMUNITY
Start: 2021-03-08 | End: 2021-05-24

## 2021-05-18 RX ORDER — BUPROPION HYDROCHLORIDE 300 MG/1
300 TABLET ORAL DAILY
COMMUNITY
Start: 2021-05-05 | End: 2021-05-25

## 2021-05-25 PROBLEM — F33.1 MODERATE EPISODE OF RECURRENT MAJOR DEPRESSIVE DISORDER: Status: ACTIVE | Noted: 2021-05-25

## 2021-11-16 ENCOUNTER — OFFICE VISIT (OUTPATIENT)
Dept: PODIATRY | Facility: CLINIC | Age: 36
End: 2021-11-16
Payer: MEDICARE

## 2021-11-16 VITALS — WEIGHT: 199 LBS | HEIGHT: 73 IN | BODY MASS INDEX: 26.37 KG/M2

## 2021-11-16 DIAGNOSIS — M79.672 BILATERAL FOOT PAIN: ICD-10-CM

## 2021-11-16 DIAGNOSIS — G60.9 IDIOPATHIC PERIPHERAL NEUROPATHY: ICD-10-CM

## 2021-11-16 DIAGNOSIS — M62.81 MUSCLE WEAKNESS OF LOWER EXTREMITY: Primary | ICD-10-CM

## 2021-11-16 DIAGNOSIS — M79.671 BILATERAL FOOT PAIN: ICD-10-CM

## 2021-11-16 DIAGNOSIS — L98.9 BENIGN SKIN LESION: ICD-10-CM

## 2021-11-16 PROCEDURE — 11056 PARNG/CUTG B9 HYPRKR LES 2-4: CPT | Mod: Q8,S$GLB,, | Performed by: PODIATRIST

## 2021-11-16 PROCEDURE — 99499 NO LOS: ICD-10-PCS | Mod: S$GLB,,, | Performed by: PODIATRIST

## 2021-11-16 PROCEDURE — 99499 UNLISTED E&M SERVICE: CPT | Mod: S$GLB,,, | Performed by: PODIATRIST

## 2021-11-16 PROCEDURE — 11056 PR TRIM BENIGN HYPERKERATOTIC SKIN LESION,2-4: ICD-10-PCS | Mod: Q8,S$GLB,, | Performed by: PODIATRIST

## 2021-11-16 RX ORDER — HYDROXYZINE HYDROCHLORIDE 25 MG/1
TABLET, FILM COATED ORAL
COMMUNITY
Start: 2021-03-08 | End: 2022-02-16 | Stop reason: SDUPTHER

## 2021-11-16 RX ORDER — QUETIAPINE FUMARATE 25 MG/1
TABLET, FILM COATED ORAL
COMMUNITY
Start: 2021-09-18 | End: 2022-01-18

## 2021-11-16 RX ORDER — OLANZAPINE 5 MG/1
TABLET ORAL
COMMUNITY
Start: 2021-08-20 | End: 2022-01-18

## 2022-05-17 ENCOUNTER — OFFICE VISIT (OUTPATIENT)
Dept: PODIATRY | Facility: CLINIC | Age: 37
End: 2022-05-17
Payer: MEDICARE

## 2022-05-17 VITALS — RESPIRATION RATE: 16 BRPM | BODY MASS INDEX: 22.8 KG/M2 | WEIGHT: 172 LBS | HEIGHT: 73 IN

## 2022-05-17 DIAGNOSIS — D23.72 BENIGN NEOPLASM OF SKIN OF LEFT LOWER EXTREMITY, INCLUDING HIP: ICD-10-CM

## 2022-05-17 DIAGNOSIS — M79.671 BILATERAL FOOT PAIN: ICD-10-CM

## 2022-05-17 DIAGNOSIS — G60.9 IDIOPATHIC PERIPHERAL NEUROPATHY: ICD-10-CM

## 2022-05-17 DIAGNOSIS — M62.81 MUSCLE WEAKNESS OF LOWER EXTREMITY: Primary | ICD-10-CM

## 2022-05-17 DIAGNOSIS — D23.71 BENIGN NEOPLASM OF SKIN OF RIGHT LOWER EXTREMITY, INCLUDING HIP: ICD-10-CM

## 2022-05-17 DIAGNOSIS — M79.672 BILATERAL FOOT PAIN: ICD-10-CM

## 2022-05-17 PROCEDURE — 11056 PR TRIM BENIGN HYPERKERATOTIC SKIN LESION,2-4: ICD-10-PCS | Mod: Q9,S$GLB,, | Performed by: PODIATRIST

## 2022-05-17 PROCEDURE — 11056 PARNG/CUTG B9 HYPRKR LES 2-4: CPT | Mod: Q9,S$GLB,, | Performed by: PODIATRIST

## 2022-05-17 PROCEDURE — 99499 UNLISTED E&M SERVICE: CPT | Mod: S$GLB,,, | Performed by: PODIATRIST

## 2022-05-17 PROCEDURE — 99499 NO LOS: ICD-10-PCS | Mod: S$GLB,,, | Performed by: PODIATRIST
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
## 2022-05-17 NOTE — PATIENT INSTRUCTIONS
What is a Corn? What is a Callus?    Corns and calluses are areas of thickened skin that develop to protect that area from irritation. They occur when something rubs against the foot repeatedly or causes excess pressure against part of the foot. The term callus commonly is used if the thickening of skin occurs on the bottom of the foot, and if thickening occurs on the top of the foot (or toe), it's called a corn. However, the location of the thickened skin is less important than the pattern of thickening: flat, widespread skin thickening indicates a callus, and skin lesions that are thicker or deeper indicate a corn.    Corns and calluses are not contagious but may become painful if they get too thick. In people with diabetes or decreased circulation, they can lead to more serious foot problems.      Causes  Corns often occur where a toe rubs against the interior of a shoe. Excessive pressure at the balls of the feet--common in women who regularly wear high heels--may cause calluses to develop on the balls of the feet.    People with certain deformities of the foot, such as hammer toes, are prone to corns and calluses.      Symptoms  Corns and calluses typically have a rough, dull appearance. They may be raised or rounded, and they can be hard to differentiate from warts. Corns or calluses sometimes cause pain.      Home Care  Mild corns and calluses may not require treatment. If the corn or callus isn't bothering you, it can probably be left alone. It's a good idea, though, to investigate possible causes of the corn or callus. If your footwear is contributing to the development of a corn or callus, it's time to look for other shoes.    Over-the-counter treatments can do more harm than good, especially if you have any medical conditions such as diabetes. Some over-the-counter treatments contain harsh chemicals, which can lead to burns or even foot ulcers.       When to Visit a Podiatrist  If corns or calluses are  causing pain and discomfort or inhibiting your daily life in any way, see a podiatrist. Also, people with diabetes, poor circulation, or other serious illnesses should have their feet checked.      Diagnosis and Treatment  The podiatrist will conduct a complete examination of your feet. X-rays may be taken; your podiatrist may also want to inspect your shoes and watch you walk. He or she will also take a complete medical history. Corns and calluses are diagnosed based on appearance and history.    If you have mild corns or calluses, your podiatrist may suggest changing your shoes and/or adding padding to your shoes. Larger corns and calluses are most effectively reduced (made smaller) with a surgical blade. A podiatrist can use the blade to carefully shave away the thickened, dead skin--right in the office. The procedure is painless because the skin is already dead. Additional treatments may be needed if the corn or callus recurs.    Cortisone injections into the foot or toe may be given if the corn or callus is causing significant pain. Surgery may be necessary in cases that do not respond to conservative treatment.      Prevention  Wear properly fitted shoes. If you have any deformities of the toe or foot, talk to your podiatrist to find out what shoes are best for you.  Gel pad inserts may decrease friction points and pressure. Your podiatrist can help you determine where pads might be useful.        Peripheral Neuropathy  Peripheral neuropathy is a condition that affects the nerves of the arms or legs. It causes a change in physical feeling. Sometimes it causes weakness in the muscles. You may feel tingling, numbness or shooting pains. Symptoms may be more common at night. Skin may be extra sensitive to light touch or temperature changes.  Neuropathy may be a complication of a chronic disease such as diabetes. A ruptured disk with pressure on the spinal nerve may also lead to the problem. Certain vitamin  deficiencies may lead to it. It may also be caused by exposure to certain drugs or chemicals.    Home care  Tell the healthcare provider about all medicines you take. This includes prescription and over-the-counter medicines, vitamins, and herbs. Ask if any of the medicines may be causing your problems. Do not make any changes to prescription medicines without talking to your healthcare provider first.  You may be prescribed medicines to help relieve the tingling feeling or for pain. Take all medicines as directed.  A numb hand or foot may be more prone to injury. To help protect it:  Always use oven mitts.  Test water with an unaffected hand or foot.  Use caution when trimming nails. File sharp areas.  Wear shoes that fit well to avoid pressure points, blisters, and ulcers.  Inspect your hands and feet carefully (including the soles of your feet and between your toes) at least once a week. If you see red areas, sores, or other problems, tell your healthcare provider.    Follow-up care  Follow up with your doctor or as advised by our staff. You may need further testing or evaluation.    When to seek medical advice  Call your healthcare provider right away if any of the following occur:  Redness, swelling, cracking, or ulcer on any numb area, especially the feet  New symptoms of numbness or muscle weakness numbness  Loss of bowel or bladder control  Slurred speech, confusion, or trouble speaking, walking, or seeing    Date Last Reviewed: 9/26/2015  © 0861-9356 [employer]. [address] [place] [address], [place] [address]. All rights reserved. This information is not intended as a substitute for professional medical care. Always follow your healthcare professional's instructions.

## 2023-10-25 ENCOUNTER — HOSPITAL ENCOUNTER (EMERGENCY)
Facility: OTHER | Age: 38
Discharge: HOME | End: 2023-10-25
Attending: EMERGENCY MEDICINE
Payer: MEDICARE

## 2023-10-25 VITALS
SYSTOLIC BLOOD PRESSURE: 115 MMHG | OXYGEN SATURATION: 100 % | HEART RATE: 83 BPM | RESPIRATION RATE: 16 BRPM | WEIGHT: 140 LBS | TEMPERATURE: 99 F | DIASTOLIC BLOOD PRESSURE: 70 MMHG | BODY MASS INDEX: 18.47 KG/M2

## 2023-10-25 DIAGNOSIS — K59.00 CONSTIPATION: ICD-10-CM

## 2023-10-25 LAB
ALBUMIN SERPL BCP-MCNC: 4 G/DL (ref 3.5–5.2)
ALP SERPL-CCNC: 64 U/L (ref 55–135)
ALT SERPL W/O P-5'-P-CCNC: 14 U/L (ref 10–44)
ANION GAP SERPL CALC-SCNC: 9 MMOL/L (ref 8–16)
AST SERPL-CCNC: 14 U/L (ref 10–40)
BASOPHILS # BLD AUTO: 0.02 K/UL (ref 0–0.2)
BASOPHILS NFR BLD: 0.4 % (ref 0–1.9)
BILIRUB SERPL-MCNC: 0.6 MG/DL (ref 0.1–1)
BUN SERPL-MCNC: 15 MG/DL (ref 6–20)
CALCIUM SERPL-MCNC: 9.3 MG/DL (ref 8.7–10.5)
CHLORIDE SERPL-SCNC: 110 MMOL/L (ref 95–110)
CO2 SERPL-SCNC: 25 MMOL/L (ref 23–29)
CREAT SERPL-MCNC: 0.7 MG/DL (ref 0.5–1.4)
DIFFERENTIAL METHOD: NORMAL
EOSINOPHIL # BLD AUTO: 0.1 K/UL (ref 0–0.5)
EOSINOPHIL NFR BLD: 1.3 % (ref 0–8)
ERYTHROCYTE [DISTWIDTH] IN BLOOD BY AUTOMATED COUNT: 13.7 % (ref 11.5–14.5)
EST. GFR  (NO RACE VARIABLE): >60 ML/MIN/1.73 M^2
GLUCOSE SERPL-MCNC: 100 MG/DL (ref 70–110)
HCT VFR BLD AUTO: 43.8 % (ref 40–54)
HCV AB SERPL QL IA: NEGATIVE
HGB BLD-MCNC: 14.9 G/DL (ref 14–18)
HIV 1+2 AB+HIV1 P24 AG SERPL QL IA: NEGATIVE
IMM GRANULOCYTES # BLD AUTO: 0.01 K/UL (ref 0–0.04)
IMM GRANULOCYTES NFR BLD AUTO: 0.2 % (ref 0–0.5)
LYMPHOCYTES # BLD AUTO: 1.2 K/UL (ref 1–4.8)
LYMPHOCYTES NFR BLD: 21.9 % (ref 18–48)
MAGNESIUM SERPL-MCNC: 1.9 MG/DL (ref 1.6–2.6)
MCH RBC QN AUTO: 29 PG (ref 27–31)
MCHC RBC AUTO-ENTMCNC: 34 G/DL (ref 32–36)
MCV RBC AUTO: 85 FL (ref 82–98)
MONOCYTES # BLD AUTO: 0.3 K/UL (ref 0.3–1)
MONOCYTES NFR BLD: 5.7 % (ref 4–15)
NEUTROPHILS # BLD AUTO: 3.9 K/UL (ref 1.8–7.7)
NEUTROPHILS NFR BLD: 70.5 % (ref 38–73)
NRBC BLD-RTO: 0 /100 WBC
PLATELET # BLD AUTO: 150 K/UL (ref 150–450)
PMV BLD AUTO: 9.5 FL (ref 9.2–12.9)
POTASSIUM SERPL-SCNC: 4.2 MMOL/L (ref 3.5–5.1)
PROT SERPL-MCNC: 6.3 G/DL (ref 6–8.4)
RBC # BLD AUTO: 5.13 M/UL (ref 4.6–6.2)
SODIUM SERPL-SCNC: 144 MMOL/L (ref 136–145)
WBC # BLD AUTO: 5.58 K/UL (ref 3.9–12.7)

## 2023-10-25 PROCEDURE — 86803 HEPATITIS C AB TEST: CPT | Performed by: EMERGENCY MEDICINE

## 2023-10-25 PROCEDURE — 96360 HYDRATION IV INFUSION INIT: CPT

## 2023-10-25 PROCEDURE — 85025 COMPLETE CBC W/AUTO DIFF WBC: CPT | Performed by: EMERGENCY MEDICINE

## 2023-10-25 PROCEDURE — 80053 COMPREHEN METABOLIC PANEL: CPT | Performed by: EMERGENCY MEDICINE

## 2023-10-25 PROCEDURE — 99284 EMERGENCY DEPT VISIT MOD MDM: CPT | Mod: 25

## 2023-10-25 PROCEDURE — 25000003 PHARM REV CODE 250: Performed by: EMERGENCY MEDICINE

## 2023-10-25 PROCEDURE — 83735 ASSAY OF MAGNESIUM: CPT | Performed by: EMERGENCY MEDICINE

## 2023-10-25 PROCEDURE — 87389 HIV-1 AG W/HIV-1&-2 AB AG IA: CPT | Performed by: EMERGENCY MEDICINE

## 2023-10-25 RX ORDER — SENNOSIDES 8.6 MG/1
1 TABLET ORAL DAILY
Qty: 30 TABLET | Refills: 0 | Status: SHIPPED | OUTPATIENT
Start: 2023-10-25 | End: 2023-11-14

## 2023-10-25 RX ORDER — LACTULOSE 10 G/15ML
20 SOLUTION ORAL
Status: COMPLETED | OUTPATIENT
Start: 2023-10-25 | End: 2023-10-25

## 2023-10-25 RX ORDER — POLYETHYLENE GLYCOL 3350 17 G/17G
17 POWDER, FOR SOLUTION ORAL DAILY
Qty: 20 PACKET | Refills: 0 | Status: SHIPPED | OUTPATIENT
Start: 2023-10-25 | End: 2023-11-14

## 2023-10-25 RX ADMIN — SODIUM CHLORIDE 1000 ML: 0.9 INJECTION, SOLUTION INTRAVENOUS at 07:10

## 2023-10-25 RX ADMIN — LACTULOSE 20 G: 20 SOLUTION ORAL at 09:10
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
## 2023-10-25 NOTE — ED TRIAGE NOTES
Pt reports to ED from [facility] with constipation. Pt reports he has not had a bowel movement in 20 days. Pt denies abdominal pain, nausea, or vomiting. Abdomen appears flat and non-distended. Pt states he feels anxious about not being able to use the bathroom. Denies drug or alcohol use. Aaox4.

## 2024-10-03 ENCOUNTER — OUTPATIENT CASE MANAGEMENT (OUTPATIENT)
Dept: ADMINISTRATIVE | Facility: OTHER | Age: 39
End: 2024-10-03
Payer: MEDICARE

## 2024-10-03 NOTE — LETTER
Ike Guardado  [address] [facility] [address]      Dear Ike Guardado,     I work with [facility] Outpatient Care Management Department. We received a referral to call you to discuss your medical history. These services are free of charge and are offered to [facility] patients who have recently been discharged from any of our facilities or who have medical conditions that may require the skill of a nurse to assist with management.     I am a Registered Nurse who specializes in connecting patients with available medical and financial resources as well as addressing any educational needs that may be indicated.    I attempted to reach you by telephone, but I was unsuccessful. Please call our department so that we can go over some questions with you, regarding your health.    The Outpatient Care Management Department can be reached at [phone], from 8:00AM to 4:30 PM, on Monday thru Friday.     Additionally, [facility] also has a program where a nurse is available 24/7 to answer questions or provide medical advice, their number is [phone].      Thanks,        Mari Zapien RN  Outpatient Care Management  Phone #: [phone]

## 2024-10-04 ENCOUNTER — OUTPATIENT CASE MANAGEMENT (OUTPATIENT)
Dept: ADMINISTRATIVE | Facility: OTHER | Age: 39
End: 2024-10-04
Payer: MEDICARE

## 2024-10-04 NOTE — PROGRESS NOTES
10/4/24 - Mr. Guardado returned [facility] RN call; he was provided with [facility] program information, services, and benefits.  He will discuss with his mother and call back.

## 2024-10-04 NOTE — PROGRESS NOTES
10/3/2024  2nd attempt to complete Initial Assessment  for Outpatient Care Management, left message.  Will send via portal -  unable to assess letter.

## 2024-10-08 ENCOUNTER — OUTPATIENT CASE MANAGEMENT (OUTPATIENT)
Dept: ADMINISTRATIVE | Facility: OTHER | Age: 39
End: 2024-10-08
Payer: MEDICARE

## 2024-10-08 NOTE — PROGRESS NOTES
Outpatient Care Management  Patient Does Not Consent    Patient: Ike Guardado  MRN:  [id number]  Date of Service:  10/8/2024  Completed by:  Mari Zapien RN    Chief Complaint   Patient presents with    [facility] Enrollment Call       Patient Summary           Consent Received:  Decline  Needs met by others.

## 2025-01-01 DIAGNOSIS — U07.1 COVID-19 VIRUS DETECTED: ICD-10-CM
